# Patient Record
Sex: MALE | Race: WHITE | Employment: OTHER | URBAN - METROPOLITAN AREA
[De-identification: names, ages, dates, MRNs, and addresses within clinical notes are randomized per-mention and may not be internally consistent; named-entity substitution may affect disease eponyms.]

---

## 2018-03-01 ENCOUNTER — TELEPHONE (OUTPATIENT)
Dept: AUDIOLOGY | Facility: CLINIC | Age: 83
End: 2018-03-01

## 2018-03-01 NOTE — TELEPHONE ENCOUNTER
Cholo Hernandez was seen at the [facility] on 3/1/18 for hearing aid services.  His left BTE Hearing aid was cleaned and the tube and dome were replaced.  The hearing aid was found to be working normally.  Mr. Hernandez was provided with a set of 3 spare tubes and domes as well as contact information for the clinic in order to request more in the future.  He will return to the clinic as needed.